# Patient Record
Sex: MALE | Race: WHITE | NOT HISPANIC OR LATINO
[De-identification: names, ages, dates, MRNs, and addresses within clinical notes are randomized per-mention and may not be internally consistent; named-entity substitution may affect disease eponyms.]

---

## 2022-11-09 ENCOUNTER — APPOINTMENT (OUTPATIENT)
Dept: ORTHOPEDIC SURGERY | Facility: CLINIC | Age: 72
End: 2022-11-09

## 2022-11-09 DIAGNOSIS — Z78.9 OTHER SPECIFIED HEALTH STATUS: ICD-10-CM

## 2022-11-09 DIAGNOSIS — M77.01 MEDIAL EPICONDYLITIS, RIGHT ELBOW: ICD-10-CM

## 2022-11-09 DIAGNOSIS — Z86.79 PERSONAL HISTORY OF OTHER DISEASES OF THE CIRCULATORY SYSTEM: ICD-10-CM

## 2022-11-09 DIAGNOSIS — Z85.46 PERSONAL HISTORY OF MALIGNANT NEOPLASM OF PROSTATE: ICD-10-CM

## 2022-11-09 DIAGNOSIS — Z86.39 PERSONAL HISTORY OF OTHER ENDOCRINE, NUTRITIONAL AND METABOLIC DISEASE: ICD-10-CM

## 2022-11-09 PROBLEM — Z00.00 ENCOUNTER FOR PREVENTIVE HEALTH EXAMINATION: Status: ACTIVE | Noted: 2022-11-09

## 2022-11-09 PROCEDURE — 20605 DRAIN/INJ JOINT/BURSA W/O US: CPT | Mod: RT

## 2022-11-09 PROCEDURE — 99203 OFFICE O/P NEW LOW 30 MIN: CPT | Mod: 25

## 2022-11-09 PROCEDURE — 73070 X-RAY EXAM OF ELBOW: CPT | Mod: FY,RT

## 2022-11-09 NOTE — PROCEDURE
[Right] : of the right [Medial epicondyle] : medial epicondyle [Pain] : pain [Inflammation] : inflammation [Betadine] : betadine [Ethyl Chloride sprayed topically] : ethyl chloride sprayed topically [Sterile technique used] : sterile technique used [___ cc    6mg] :  Betamethasone (Celestone) ~Vcc of 6mg [___ cc    1%] : Lidocaine ~Vcc of 1%  [] : Patient tolerated procedure well [Call if redness, pain or fever occur] : call if redness, pain or fever occur [Apply ice for 15min out of every hour for the next 12-24 hours as tolerated] : apply ice for 15 minutes out of every hour for the next 12-24 hours as tolerated [Patient was advised to rest the joint(s) for ____ days] : patient was advised to rest the joint(s) for [unfilled] days [Previous OTC use and PT nontherapeutic] : patient has tried OTC's including aspirin, Ibuprofen, Aleve, etc or prescription NSAIDS, and/or exercises at home and/or physical therapy without satisfactory response [Patient had decreased mobility in the joint] : patient had decreased mobility in the joint [Risks, benefits, alternatives discussed / Verbal consent obtained] : the risks benefits, and alternatives have been discussed, and verbal consent was obtained

## 2022-11-10 NOTE — HISTORY OF PRESENT ILLNESS
[de-identified] : Patient is here today for the right elbow.  Patient denie sany injury but does golf.  He has noted pain for approx 1-2 mos.  He notes pain medial elbow worse with swinging. He saw [facility] approx 10 yrs ago and given CSI with good relief.  Denies any paresthesias.

## 2022-11-10 NOTE — DISCUSSION/SUMMARY
[de-identified] : Patient wants to proceed with CSI medial epicondyle - patient received CSI in office today and tolerated procedure well. \par Patient will f/u in 1 month.\par \par Entered by Carmelita Phelps acting as scribe. \par Dr. Srivastava Attestation\par The documentation recorded by the scribe, in my presence, accurately reflects the service I, Dr. Srivastava, personally performed, and the decisions made by me with my edits as appropriate.\par

## 2022-11-10 NOTE — PHYSICAL EXAM
[Right] : right elbow [de-identified] : Constitutional: The patient appears well developed, well nourished. Examination of patients ability to communicate functionally was normal. \par \par Neurologic: Coordination is normal. Alert and oriented to time, place and person. No evidence of mood disorder, calm affect. \par \par RIGHT      ELBOW: Inspection of the elbow/arm is as follows: no swelling, no ecchymosis, no erythema, no deformity, no lacerations/abrasions, no rashes, no masses and no atrophy.\par \par Palpation of the elbow/arm is as follows: Tenderness over medial epicondyle. No tenderness over radial head, over lateral epicondyle and over olecranon. No palpable masses. \par \par Range of Motion of the elbow/arm is as follows: Pain with flexion, extension and pronation. \par Elbow motion:\par Extension:  0 degrees\par Flexion:  140 degrees\par Supination:  90 degrees\par Pronation:  90 degrees. \par \par Strength examination of the elbow/arm is as follows: \par flexion strength:  5/5\par extension strength:  5/5\par pronation strength:  5/5 \par supination strength:  5/5 \par \par Special Testing of the elbow/arm is as follows: Medial elbow pain with resisted wrist flexion. Medial elbow pain with resisted forearm pronation. No varus or valgus instability in various degrees of elbow flexion and extension\par \par  Neurological testing of the elbow/arm is as follows: motor exam 5/5 and light touch intact. \par   [FreeTextEntry1] : ap/lat show lateral epicondyle calcifications and spur about the radial head region.